# Patient Record
Sex: MALE | Race: WHITE | HISPANIC OR LATINO | URBAN - METROPOLITAN AREA
[De-identification: names, ages, dates, MRNs, and addresses within clinical notes are randomized per-mention and may not be internally consistent; named-entity substitution may affect disease eponyms.]

---

## 2022-02-17 ENCOUNTER — OFFICE (OUTPATIENT)
Dept: URBAN - METROPOLITAN AREA CLINIC 79 | Facility: CLINIC | Age: 62
End: 2022-02-17
Payer: MEDICAID

## 2022-02-17 VITALS
TEMPERATURE: 97.1 F | HEIGHT: 72 IN | WEIGHT: 190 LBS | HEART RATE: 55 BPM | DIASTOLIC BLOOD PRESSURE: 59 MMHG | SYSTOLIC BLOOD PRESSURE: 110 MMHG

## 2022-02-17 DIAGNOSIS — K59.09 OTHER CONSTIPATION: ICD-10-CM

## 2022-02-17 DIAGNOSIS — R10.13 EPIGASTRIC PAIN: ICD-10-CM

## 2022-02-17 DIAGNOSIS — K29.70 GASTRITIS, UNSPECIFIED, WITHOUT BLEEDING: ICD-10-CM

## 2022-02-17 PROCEDURE — 99204 OFFICE O/P NEW MOD 45 MIN: CPT | Performed by: PHYSICIAN ASSISTANT

## 2022-02-17 RX ORDER — LINACLOTIDE 72 UG/1
CAPSULE, GELATIN COATED ORAL
Qty: 30 | Refills: 0 | Status: COMPLETED
Start: 2022-02-17 | End: 2022-12-29

## 2022-02-17 RX ORDER — FAMOTIDINE 20 MG/1
TABLET, FILM COATED ORAL
Qty: 60 | Refills: 11 | Status: COMPLETED
Start: 2022-02-17 | End: 2022-12-29

## 2022-02-17 NOTE — SERVICEHPINOTES
MADELINE CABRAL   is a   61   year old male who is being seen in consultation at the request of   RUBEN MEHTA   for GERD and chronic constipation. He informs of "stomach aches" that are intermittent in nature, worse when over eating or consuming trigger foods such as spicy meals: No nausea/vomiting. He has been using Pantoprazole 40 mg qd 30 min prior to meals since end of 12/2021 that has not helped. Last EGD in 12/2021 by [facility] noting chronic gastritis bx neg h pylori and bx neg IM as well as normal duodenum bx neg celiac. Prior to Pantoprazole he was using Omeprazole for years with "no difference" noted. He was seen by PCP who extended course of PPI Pantoprazole 40 mg qd  and started rx Sucralfate that has made his "constipation worse" yet, keeps using this rx. Given the constipation, he began to take Miralax 1 scoop BID x 4-5 days that seemed to help but, it was stopped due to "it hurts my kidneys." Long h/o GERD as documented in prior visit at our office 10 yrs ago around 2011. Prior EGD from 04/2011 findings benign (records in our chart).  He has BMs qd to qod, feels "backed up" and a lot of flatulence.. Recent colonoscopy in 2021 at [facility] (requesting records). Denies chest pain, n/v, focal abdominal pain, change in BMs, melena, weight loss. Patient is a poor historian..

## 2022-12-29 ENCOUNTER — OFFICE (OUTPATIENT)
Dept: URBAN - METROPOLITAN AREA CLINIC 79 | Facility: CLINIC | Age: 62
End: 2022-12-29
Payer: MEDICAID

## 2022-12-29 VITALS
HEIGHT: 72 IN | HEART RATE: 57 BPM | DIASTOLIC BLOOD PRESSURE: 65 MMHG | TEMPERATURE: 98.1 F | WEIGHT: 196 LBS | SYSTOLIC BLOOD PRESSURE: 112 MMHG

## 2022-12-29 DIAGNOSIS — N40.0 BENIGN PROSTATIC HYPERPLASIA WITHOUT LOWER URINARY TRACT SYM: ICD-10-CM

## 2022-12-29 DIAGNOSIS — K59.09 OTHER CONSTIPATION: ICD-10-CM

## 2022-12-29 PROCEDURE — 99214 OFFICE O/P EST MOD 30 MIN: CPT | Performed by: PHYSICIAN ASSISTANT

## 2022-12-29 NOTE — SERVICEHPINOTES
MADELINE CABRAL   is a   63 yo white  male who complains of ongoing constipation. Last seen in 02/2022 for same concern that led to trial of rx Linzess. He informs rx Linzess 145 mcg qd that helps achieve BMs daily and BSS type 2: No blood in stool or BRBPR. He has been using Linzess 145 mcg, 2 pills qd that has improved stool consistency to BSS type 4-5: No diarrhea. Last colonoscopy in 2019 to  2021 time frame at [facility] (requesting records). Denies chest pain, n/v, abdominal pain, change in BMs, melena, weight loss. Patient is a poor historian.br
br

## 2023-04-06 ENCOUNTER — OFFICE (OUTPATIENT)
Dept: URBAN - METROPOLITAN AREA CLINIC 79 | Facility: CLINIC | Age: 63
End: 2023-04-06

## 2023-04-06 ENCOUNTER — OFFICE (OUTPATIENT)
Dept: URBAN - METROPOLITAN AREA CLINIC 79 | Facility: CLINIC | Age: 63
End: 2023-04-06
Payer: MEDICAID

## 2023-04-06 VITALS
HEART RATE: 58 BPM | TEMPERATURE: 98.4 F | HEIGHT: 72 IN | WEIGHT: 196 LBS | SYSTOLIC BLOOD PRESSURE: 114 MMHG | DIASTOLIC BLOOD PRESSURE: 67 MMHG

## 2023-04-06 DIAGNOSIS — K59.09 OTHER CONSTIPATION: ICD-10-CM

## 2023-04-06 DIAGNOSIS — Z12.11 ENCOUNTER FOR SCREENING FOR MALIGNANT NEOPLASM OF COLON: ICD-10-CM

## 2023-04-06 DIAGNOSIS — N40.0 BENIGN PROSTATIC HYPERPLASIA WITHOUT LOWER URINARY TRACT SYM: ICD-10-CM

## 2023-04-06 PROCEDURE — 00031: CPT | Performed by: INTERNAL MEDICINE

## 2023-04-06 PROCEDURE — 99214 OFFICE O/P EST MOD 30 MIN: CPT | Performed by: PHYSICIAN ASSISTANT

## 2023-04-06 RX ORDER — LINACLOTIDE 145 UG/1
CAPSULE, GELATIN COATED ORAL
Qty: 90 | Refills: 3 | Status: COMPLETED
Start: 2023-04-06 | End: 2023-09-27

## 2023-04-06 NOTE — SERVICEHPINOTES
MADELINE BURNS   is a   61 yo white  male who is here for f/u visit rx Linzess. Last seen in 12/2022 for chronic constipation that led to increased dose Linzess 145 mcg qd to 290 mcg qd. He is using Linzess 290 mcg every 2-3 days due to diarrhea side effect when used daily. Last colonoscopy 8-9 yrs ago at [facility] (no records). Rare NSAID use. No known cardiac or pulmonary disease. Denies chest pain, n/v, abdominal pain, change in BMs, melena, weight loss. Patient is a poor historian.br

## 2023-09-27 PROBLEM — Z86.010 PERSONAL HISTORY OF COLON POLYPS: Status: ACTIVE | Noted: 2023-09-27

## 2023-12-08 ENCOUNTER — OFFICE (OUTPATIENT)
Dept: URBAN - METROPOLITAN AREA CLINIC 30 | Facility: CLINIC | Age: 63
End: 2023-12-08
Payer: MEDICAID

## 2023-12-08 VITALS
RESPIRATION RATE: 17 BRPM | DIASTOLIC BLOOD PRESSURE: 73 MMHG | SYSTOLIC BLOOD PRESSURE: 98 MMHG | SYSTOLIC BLOOD PRESSURE: 109 MMHG | DIASTOLIC BLOOD PRESSURE: 59 MMHG | SYSTOLIC BLOOD PRESSURE: 96 MMHG | DIASTOLIC BLOOD PRESSURE: 56 MMHG | RESPIRATION RATE: 20 BRPM | DIASTOLIC BLOOD PRESSURE: 58 MMHG | OXYGEN SATURATION: 95 % | RESPIRATION RATE: 16 BRPM | OXYGEN SATURATION: 99 % | HEIGHT: 72 IN | TEMPERATURE: 97.5 F | SYSTOLIC BLOOD PRESSURE: 112 MMHG | OXYGEN SATURATION: 96 % | RESPIRATION RATE: 14 BRPM | RESPIRATION RATE: 15 BRPM | HEART RATE: 43 BPM | OXYGEN SATURATION: 98 % | HEART RATE: 47 BPM | HEART RATE: 58 BPM | HEART RATE: 50 BPM | DIASTOLIC BLOOD PRESSURE: 76 MMHG | HEART RATE: 48 BPM | WEIGHT: 195 LBS

## 2023-12-08 DIAGNOSIS — K63.5 POLYP OF COLON: ICD-10-CM

## 2023-12-08 DIAGNOSIS — K57.30 DIVERTICULOSIS OF LARGE INTESTINE WITHOUT PERFORATION OR ABS: ICD-10-CM

## 2023-12-08 DIAGNOSIS — Z86.010 PERSONAL HISTORY OF COLONIC POLYPS: ICD-10-CM

## 2023-12-08 DIAGNOSIS — Z09 ENCOUNTER FOR FOLLOW-UP EXAMINATION AFTER COMPLETED TREATMEN: ICD-10-CM

## 2023-12-08 LAB
ANATOMIC PATHOLOGY REPORT: (no result)
PDF REPORT: PDF REPORT1: (no result)

## 2024-06-17 ENCOUNTER — OFFICE (OUTPATIENT)
Dept: URBAN - METROPOLITAN AREA CLINIC 79 | Facility: CLINIC | Age: 64
End: 2024-06-17

## 2024-06-17 PROCEDURE — 00019: CPT | Performed by: NURSE PRACTITIONER
